# Patient Record
Sex: MALE | Race: AMERICAN INDIAN OR ALASKA NATIVE
[De-identification: names, ages, dates, MRNs, and addresses within clinical notes are randomized per-mention and may not be internally consistent; named-entity substitution may affect disease eponyms.]

---

## 2018-07-01 ENCOUNTER — HOSPITAL ENCOUNTER (EMERGENCY)
Dept: HOSPITAL 31 - C.ER | Age: 43
Discharge: HOME | End: 2018-07-01
Payer: MEDICARE

## 2018-07-01 VITALS
OXYGEN SATURATION: 99 % | SYSTOLIC BLOOD PRESSURE: 144 MMHG | TEMPERATURE: 98.4 F | DIASTOLIC BLOOD PRESSURE: 86 MMHG | RESPIRATION RATE: 18 BRPM | HEART RATE: 68 BPM

## 2018-07-01 VITALS — BODY MASS INDEX: 56.8 KG/M2

## 2018-07-01 DIAGNOSIS — S93.401A: Primary | ICD-10-CM

## 2018-07-01 DIAGNOSIS — M19.071: ICD-10-CM
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
## 2018-07-01 NOTE — RAD
PROCEDURE:  Right Ankle Radiographs.



HISTORY:

RIGHT ANKLE PAIN  



COMPARISON:

None



FINDINGS:



BONES:

No evidence of acute displaced fracture nor dislocation. The osseous 

structures appear intact.  Degenerative osteoarthritis. 



JOINTS:

Degenerative osteoarthritis right tibiotalar articulation.  There 

also degenerative osteoarthritic changes of the bones of the midfoot. 



SOFT TISSUES:

There appears to be mild diffuse soft tissue swelling most pronounced 

medially.  Findings nonspecific; rule out the cellulitis versus 

vascular etiology. 



OTHER FINDINGS:

None.



IMPRESSION:

No evidence of acute displaced fracture nor dislocation.  Multi 

articular degenerative osteoarthritis.



Mild diffuse soft-tissue swelling ; rule out cellulitis versus 

vascular etiology.

## 2018-07-01 NOTE — C.PDOC
History Of Present Illness


43-year-old male presents to the emergency department with complaints of right 

ankle pain. 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 

developed sudden onset pain to his right ankle when it twisted.  He states he 

has a Hx of "hairline fracture" of the same ankle diagnosed 2 months ago by CT 

scan, has podiatrist Dr. Cuellar.  Patient has not yet followed with his 

podiatrist, and admits he has been avoiding the appointment. Patient denies any 

direct trauma, rash, redness, sensory changes, calf pain.  





- HPI


Time Seen by Provider: 07/01/18 08:17


Chief Complaint (Nursing): Trauma


History Per: Patient


History/Exam Limitations: no limitations


Onset/Duration Of Symptoms: Hrs


Injury Occurred (Timing): Just Before Arrival


Severity: Moderate





Past Medical History


Reviewed: Historical Data, Nursing Documentation, Vital Signs


Vital Signs: 


 Last Vital Signs











Temp  98.4 F   07/01/18 09:11


 


Pulse  68   07/01/18 09:11


 


Resp  18   07/01/18 09:11


 


BP  144/86   07/01/18 09:11


 


Pulse Ox  99   07/01/18 18:09














- Medical History


PMH: HTN, Peripheral Edema, Sleep Apnea (POSITIVE SLEEP STUDY, NO C PAP)


Surgical History: Endoscopy





- [facility] Procedures








ESOPHAGOGASTRODUODENOSCOPY [EGD] W/CLOSED BIOPSY (05/07/15)


EXCISION OF STOMACH, PERCUTANEOUS ENDOSCOPIC APPROACH, VERT (10/14/15)


INSPECTION OF UPPER INTESTINAL TRACT, ENDO (10/14/15)


ROBOTIC ASSISTED PROCEDURE OF TRUNK, PERC ENDO APPROACH (10/14/15)








Family History: States: No Known Family Hx





- Social History


Hx Alcohol Use: No


Hx Substance Use: No





Review Of Systems


Constitutional: Negative for: Fever


Respiratory: Negative for: Shortness of Breath


Musculoskeletal: Positive for: Foot Pain.  Negative for: Back Pain, Leg Pain


Skin: Negative for: Rash


Neurological: Negative for: Weakness, Numbness





Physical Exam





- Physical Exam


Appears: Well, Non-toxic, Other (in mild discomfort)


Skin: Normal Color, Warm, Dry, No Rash


Head: Normacephalic


Eye(s): bilateral: Normal Inspection


Oral Mucosa: Moist


Cardiovascular: Rhythm Regular


Respiratory: Normal Breath Sounds, No Rales, No Rhonchi, No Wheezing


Extremity: Tenderness (limited ROM due to pain, (+) diffuse TTP of right ankle)

, No Calf Tenderness, Capillary Refill (< 2 sec all digits ), No Deformity, No 

Swelling, Other (no erythema, no rash)


Extremity: Bilateral: Atraumatic, Normal Color And Temperature


Pulses: Left Dorsalis Pedis: Normal, Right Dorsalis Pedis: Normal


Neurological/Psych: Oriented x3, Normal Sensation





ED Course And Treatment


O2 Sat by Pulse Oximetry: 99 (RA)


Pulse Ox Interpretation: Normal





- Other Rad


  ** XR ankle


X-Ray: Viewed By Me, Read By Radiologist


Interpretation: Accession No. : [id number].  Patient Name / ID : PRABHA CHAVEZ E  / [id number].  Exam Date : 07/01/2018 08:36:14 ( Approved ).  Study 

Comment :  Sex / Age : M  / 43Y.  Creator : Constantine Littlejohn MD.  Dictator :

   :  Approver : Constantine Littlejohn MD.  Approver2 :  Report Date : 

07/01/2018 10:13:11.  My Comment :  ********************************************

***************************************.  PROCEDURE:  Right Ankle Radiographs.  

HISTORY:  RIGHT ANKLE PAIN.  COMPARISON:  None.  FINDINGS:  BONES:  No evidence 

of acute displaced fracture nor dislocation. The osseous structures appear 

intact.  Degenerative osteoarthritis.  JOINTS:  Degenerative osteoarthritis 

right tibiotalar articulation.  There also degenerative osteoarthritic changes 

of the bones of the midfoot.  SOFT TISSUES:  There appears to be mild diffuse 

soft tissue swelling most pronounced medially.  Findings nonspecific; rule out 

the cellulitis versus vascular etiology.  OTHER FINDINGS:  None.  IMPRESSION:  

No evidence of acute displaced fracture nor dislocation.  Multi articular 

degenerative osteoarthritis.  Mild diffuse soft-tissue swelling ; rule out 

cellulitis versus vascular etiology.


Progress Note: Xray of right ankle ordered and reviewed.  Patient given PO 

Naprosyn.  Patient's pain worsens with weight bearing, placed in acw wrap, air 

cast and given cructches and instruction by ED tech.  Patient instructed to 

follow up with his podiatrist within 1 week, and understands he should return 

to ED if symptoms worsen.


Reevaluation Time: 09:25


Reassessment Condition: Improved





Disposition


Counseled Patient/Family Regarding: Studies Performed, Diagnosis, Need For 

Followup, Rx Given





- Disposition


Referrals: 


Sheron Cuellar DPM [Non-Staff] - 


Disposition: HOME/ ROUTINE


Disposition Time: 09:25


Condition: STABLE


Additional Instructions: 


FOLLOW UP WITH YOUR PODATRIST WITHIN 1 WEEK





USE MEDICATION AS NEEDED





RETURN TO ER IF SYMPTOMS WORSEN


Prescriptions: 


Naproxen 375 mg PO BID PRN #20 tablet


 PRN Reason: pain


Instructions:  Ankle Sprain (DC)


Forms:  [employer] (English), Work Excuse


Print Language: ENGLISH





- Clinical Impression


Clinical Impression: 


 Right ankle sprain, Osteoarth NOS-ankle








- Scribe Statement


The provider has reviewed the documentation as recorded by the Scribe (Anoop Ochoa)


All medical record entries made by the Scribe were at my direction and 

personally dictated by me. I have reviewed the chart and agree that the record 

accurately reflects my personal performance of the history, physical exam, 

medical decision making, and the department course for this patient. I have 

also personally directed, reviewed, and agree with the discharge instructions 

and disposition.